# Patient Record
Sex: FEMALE
[De-identification: names, ages, dates, MRNs, and addresses within clinical notes are randomized per-mention and may not be internally consistent; named-entity substitution may affect disease eponyms.]

---

## 2023-09-30 ENCOUNTER — NURSE TRIAGE (OUTPATIENT)
Dept: OTHER | Facility: CLINIC | Age: 58
End: 2023-09-30

## 2023-10-05 ENCOUNTER — NURSE TRIAGE (OUTPATIENT)
Dept: OTHER | Facility: CLINIC | Age: 58
End: 2023-10-05

## 2023-10-06 NOTE — TELEPHONE ENCOUNTER
Location of patient: [place]    Subjective: Caller states \"Pt calling was seen today at [facility]. Was told she was fighting a viral infection. Pt states now she having a lot of dizziness. States she hasn't been drinking fluids due to pain and difficulty swallowing. Feels like she could pass out from the dizziness\"     Current Symptoms: dizziness, throat pain, congestion, productive cough colored, pain in hips    Pain Severity: 10/10; Temperature: 101.8       Recommended disposition: Go to ED Now    Care advice provided, patient verbalizes understanding; denies any other questions or concerns.     Outcome: Pt will be re-evaluated at ER      This triage is a result of a call to the Nurse Disrupted Nurse Line  Reason for Disposition   Patient sounds very sick or weak to the triager    Protocols used: Dizziness - Lightheadedness-ADULT-AH